# Patient Record
Sex: FEMALE | Race: WHITE | Employment: PART TIME | URBAN - METROPOLITAN AREA
[De-identification: names, ages, dates, MRNs, and addresses within clinical notes are randomized per-mention and may not be internally consistent; named-entity substitution may affect disease eponyms.]

---

## 2020-12-14 ENCOUNTER — HOSPITAL ENCOUNTER (INPATIENT)
Age: 25
LOS: 1 days | Discharge: HOME | DRG: 310 | End: 2020-12-15
Attending: EMERGENCY MEDICINE | Admitting: INTERNAL MEDICINE
Payer: COMMERCIAL

## 2020-12-14 ENCOUNTER — APPOINTMENT (OUTPATIENT)
Dept: GENERAL RADIOLOGY | Age: 25
DRG: 310 | End: 2020-12-14
Payer: COMMERCIAL

## 2020-12-14 PROBLEM — I48.91 ATRIAL FIBRILLATION WITH RAPID VENTRICULAR RESPONSE (HCC): Status: ACTIVE | Noted: 2020-12-14

## 2020-12-14 LAB
ABSOLUTE EOS #: 0.16 K/UL (ref 0–0.44)
ABSOLUTE IMMATURE GRANULOCYTE: 0.07 K/UL (ref 0–0.3)
ABSOLUTE LYMPH #: 2.32 K/UL (ref 1.1–3.7)
ABSOLUTE MONO #: 0.88 K/UL (ref 0.1–1.2)
AMPHETAMINE SCREEN URINE: NEGATIVE
ANION GAP SERPL CALCULATED.3IONS-SCNC: 13 MMOL/L (ref 9–17)
BARBITURATE SCREEN URINE: NEGATIVE
BASOPHILS # BLD: 0 % (ref 0–2)
BASOPHILS ABSOLUTE: 0.04 K/UL (ref 0–0.2)
BENZODIAZEPINE SCREEN, URINE: NEGATIVE
BUN BLDV-MCNC: 15 MG/DL (ref 6–20)
BUN/CREAT BLD: 18 (ref 9–20)
BUPRENORPHINE URINE: NORMAL
CALCIUM SERPL-MCNC: 10.2 MG/DL (ref 8.6–10.4)
CANNABINOID SCREEN URINE: NEGATIVE
CHLORIDE BLD-SCNC: 101 MMOL/L (ref 98–107)
CO2: 24 MMOL/L (ref 20–31)
COCAINE METABOLITE, URINE: NEGATIVE
CREAT SERPL-MCNC: 0.85 MG/DL (ref 0.5–0.9)
D-DIMER QUANTITATIVE: 1.05 MG/L FEU (ref 0–0.59)
DIFFERENTIAL TYPE: ABNORMAL
EOSINOPHILS RELATIVE PERCENT: 1 % (ref 1–4)
GFR AFRICAN AMERICAN: >60 ML/MIN
GFR NON-AFRICAN AMERICAN: >60 ML/MIN
GFR SERPL CREATININE-BSD FRML MDRD: ABNORMAL ML/MIN/{1.73_M2}
GFR SERPL CREATININE-BSD FRML MDRD: ABNORMAL ML/MIN/{1.73_M2}
GLUCOSE BLD-MCNC: 121 MG/DL (ref 70–99)
HCT VFR BLD CALC: 41.3 % (ref 36.3–47.1)
HEMOGLOBIN: 13.8 G/DL (ref 11.9–15.1)
IMMATURE GRANULOCYTES: 1 %
INR BLD: 1
LYMPHOCYTES # BLD: 21 % (ref 24–43)
MAGNESIUM: 2 MG/DL (ref 1.6–2.6)
MCH RBC QN AUTO: 29.4 PG (ref 25.2–33.5)
MCHC RBC AUTO-ENTMCNC: 33.4 G/DL (ref 28.4–34.8)
MCV RBC AUTO: 87.9 FL (ref 82.6–102.9)
MDMA URINE: NORMAL
METHADONE SCREEN, URINE: NEGATIVE
METHAMPHETAMINE, URINE: NORMAL
MONOCYTES # BLD: 8 % (ref 3–12)
MYOGLOBIN: <21 NG/ML (ref 25–58)
MYOGLOBIN: <21 NG/ML (ref 25–58)
NRBC AUTOMATED: 0 PER 100 WBC
OPIATES, URINE: NEGATIVE
OXYCODONE SCREEN URINE: NEGATIVE
PARTIAL THROMBOPLASTIN TIME: 27.1 SEC (ref 23.9–33.8)
PDW BLD-RTO: 12.5 % (ref 11.8–14.4)
PHENCYCLIDINE, URINE: NEGATIVE
PLATELET # BLD: 358 K/UL (ref 138–453)
PLATELET ESTIMATE: ABNORMAL
PMV BLD AUTO: 9.9 FL (ref 8.1–13.5)
POTASSIUM SERPL-SCNC: 3.7 MMOL/L (ref 3.7–5.3)
PROPOXYPHENE, URINE: NORMAL
PROTHROMBIN TIME: 12.9 SEC (ref 11.5–14.2)
RBC # BLD: 4.7 M/UL (ref 3.95–5.11)
RBC # BLD: ABNORMAL 10*6/UL
SEG NEUTROPHILS: 69 % (ref 36–65)
SEGMENTED NEUTROPHILS ABSOLUTE COUNT: 7.69 K/UL (ref 1.5–8.1)
SODIUM BLD-SCNC: 138 MMOL/L (ref 135–144)
TEST INFORMATION: NORMAL
TRICYCLIC ANTIDEPRESSANTS, UR: NORMAL
TROPONIN INTERP: ABNORMAL
TROPONIN INTERP: ABNORMAL
TROPONIN T: ABNORMAL NG/ML
TROPONIN T: ABNORMAL NG/ML
TROPONIN, HIGH SENSITIVITY: <6 NG/L (ref 0–14)
TROPONIN, HIGH SENSITIVITY: <6 NG/L (ref 0–14)
WBC # BLD: 11.2 K/UL (ref 3.5–11.3)
WBC # BLD: ABNORMAL 10*3/UL

## 2020-12-14 PROCEDURE — 83735 ASSAY OF MAGNESIUM: CPT

## 2020-12-14 PROCEDURE — 80048 BASIC METABOLIC PNL TOTAL CA: CPT

## 2020-12-14 PROCEDURE — 2060000000 HC ICU INTERMEDIATE R&B

## 2020-12-14 PROCEDURE — 71045 X-RAY EXAM CHEST 1 VIEW: CPT

## 2020-12-14 PROCEDURE — 6370000000 HC RX 637 (ALT 250 FOR IP): Performed by: NURSE PRACTITIONER

## 2020-12-14 PROCEDURE — 85025 COMPLETE CBC W/AUTO DIFF WBC: CPT

## 2020-12-14 PROCEDURE — 85610 PROTHROMBIN TIME: CPT

## 2020-12-14 PROCEDURE — 93005 ELECTROCARDIOGRAM TRACING: CPT | Performed by: EMERGENCY MEDICINE

## 2020-12-14 PROCEDURE — 85730 THROMBOPLASTIN TIME PARTIAL: CPT

## 2020-12-14 PROCEDURE — 84484 ASSAY OF TROPONIN QUANT: CPT

## 2020-12-14 PROCEDURE — 99283 EMERGENCY DEPT VISIT LOW MDM: CPT

## 2020-12-14 PROCEDURE — 85379 FIBRIN DEGRADATION QUANT: CPT

## 2020-12-14 PROCEDURE — 2580000003 HC RX 258: Performed by: NURSE PRACTITIONER

## 2020-12-14 PROCEDURE — 83874 ASSAY OF MYOGLOBIN: CPT

## 2020-12-14 PROCEDURE — 99222 1ST HOSP IP/OBS MODERATE 55: CPT | Performed by: NURSE PRACTITIONER

## 2020-12-14 PROCEDURE — 80307 DRUG TEST PRSMV CHEM ANLYZR: CPT

## 2020-12-14 PROCEDURE — 96374 THER/PROPH/DIAG INJ IV PUSH: CPT

## 2020-12-14 PROCEDURE — 93970 EXTREMITY STUDY: CPT

## 2020-12-14 PROCEDURE — 2580000003 HC RX 258: Performed by: EMERGENCY MEDICINE

## 2020-12-14 PROCEDURE — 2500000003 HC RX 250 WO HCPCS: Performed by: EMERGENCY MEDICINE

## 2020-12-14 RX ORDER — POLYETHYLENE GLYCOL 3350 17 G/17G
17 POWDER, FOR SOLUTION ORAL DAILY PRN
Status: DISCONTINUED | OUTPATIENT
Start: 2020-12-14 | End: 2020-12-15 | Stop reason: HOSPADM

## 2020-12-14 RX ORDER — FAMOTIDINE 20 MG/1
20 TABLET, FILM COATED ORAL DAILY
COMMUNITY

## 2020-12-14 RX ORDER — POTASSIUM CHLORIDE 20 MEQ/1
10 TABLET, EXTENDED RELEASE ORAL ONCE
Status: COMPLETED | OUTPATIENT
Start: 2020-12-14 | End: 2020-12-14

## 2020-12-14 RX ORDER — ONDANSETRON 2 MG/ML
4 INJECTION INTRAMUSCULAR; INTRAVENOUS EVERY 6 HOURS PRN
Status: DISCONTINUED | OUTPATIENT
Start: 2020-12-14 | End: 2020-12-15 | Stop reason: HOSPADM

## 2020-12-14 RX ORDER — ACETAMINOPHEN 325 MG/1
650 TABLET ORAL EVERY 6 HOURS PRN
Status: DISCONTINUED | OUTPATIENT
Start: 2020-12-14 | End: 2020-12-15 | Stop reason: HOSPADM

## 2020-12-14 RX ORDER — ACETAMINOPHEN 650 MG/1
650 SUPPOSITORY RECTAL EVERY 6 HOURS PRN
Status: DISCONTINUED | OUTPATIENT
Start: 2020-12-14 | End: 2020-12-15 | Stop reason: HOSPADM

## 2020-12-14 RX ORDER — DILTIAZEM HYDROCHLORIDE 5 MG/ML
20 INJECTION INTRAVENOUS ONCE
Status: COMPLETED | OUTPATIENT
Start: 2020-12-14 | End: 2020-12-14

## 2020-12-14 RX ORDER — FAMOTIDINE 20 MG/1
20 TABLET, FILM COATED ORAL DAILY
Status: DISCONTINUED | OUTPATIENT
Start: 2020-12-14 | End: 2020-12-15 | Stop reason: HOSPADM

## 2020-12-14 RX ORDER — METOPROLOL SUCCINATE 25 MG/1
25 TABLET, EXTENDED RELEASE ORAL DAILY
Status: DISCONTINUED | OUTPATIENT
Start: 2020-12-14 | End: 2020-12-15 | Stop reason: HOSPADM

## 2020-12-14 RX ORDER — SODIUM CHLORIDE 0.9 % (FLUSH) 0.9 %
10 SYRINGE (ML) INJECTION EVERY 12 HOURS SCHEDULED
Status: DISCONTINUED | OUTPATIENT
Start: 2020-12-14 | End: 2020-12-15 | Stop reason: HOSPADM

## 2020-12-14 RX ORDER — NICOTINE 21 MG/24HR
1 PATCH, TRANSDERMAL 24 HOURS TRANSDERMAL DAILY PRN
Status: DISCONTINUED | OUTPATIENT
Start: 2020-12-14 | End: 2020-12-15 | Stop reason: HOSPADM

## 2020-12-14 RX ORDER — PROMETHAZINE HYDROCHLORIDE 12.5 MG/1
12.5 TABLET ORAL EVERY 6 HOURS PRN
Status: DISCONTINUED | OUTPATIENT
Start: 2020-12-14 | End: 2020-12-15 | Stop reason: HOSPADM

## 2020-12-14 RX ORDER — SODIUM CHLORIDE 0.9 % (FLUSH) 0.9 %
10 SYRINGE (ML) INJECTION PRN
Status: DISCONTINUED | OUTPATIENT
Start: 2020-12-14 | End: 2020-12-15 | Stop reason: HOSPADM

## 2020-12-14 RX ADMIN — APIXABAN 5 MG: 5 TABLET, FILM COATED ORAL at 21:23

## 2020-12-14 RX ADMIN — DILTIAZEM HYDROCHLORIDE 10 MG/HR: 5 INJECTION INTRAVENOUS at 13:22

## 2020-12-14 RX ADMIN — METOPROLOL SUCCINATE 25 MG: 25 TABLET, EXTENDED RELEASE ORAL at 17:11

## 2020-12-14 RX ADMIN — Medication 10 ML: at 21:23

## 2020-12-14 RX ADMIN — ACETAMINOPHEN 650 MG: 325 TABLET ORAL at 17:16

## 2020-12-14 RX ADMIN — DILTIAZEM HYDROCHLORIDE 20 MG: 5 INJECTION INTRAVENOUS at 13:03

## 2020-12-14 RX ADMIN — FAMOTIDINE 20 MG: 20 TABLET, FILM COATED ORAL at 17:11

## 2020-12-14 RX ADMIN — POTASSIUM CHLORIDE 10 MEQ: 20 TABLET, EXTENDED RELEASE ORAL at 17:11

## 2020-12-14 SDOH — HEALTH STABILITY: MENTAL HEALTH: HOW OFTEN DO YOU HAVE A DRINK CONTAINING ALCOHOL?: NEVER

## 2020-12-14 SDOH — HEALTH STABILITY: MENTAL HEALTH: HOW MANY STANDARD DRINKS CONTAINING ALCOHOL DO YOU HAVE ON A TYPICAL DAY?: NOT ASKED

## 2020-12-14 ASSESSMENT — PAIN SCALES - GENERAL
PAINLEVEL_OUTOF10: 5
PAINLEVEL_OUTOF10: 6
PAINLEVEL_OUTOF10: 3

## 2020-12-14 ASSESSMENT — ENCOUNTER SYMPTOMS
VOMITING: 0
SHORTNESS OF BREATH: 1
FACIAL SWELLING: 0
DIARRHEA: 0
ABDOMINAL PAIN: 0
EYE DISCHARGE: 0
EYE REDNESS: 0
COUGH: 0
CONSTIPATION: 0
COLOR CHANGE: 0

## 2020-12-14 NOTE — CONSULTS
Reason for Consult:  Atrial fibrillation    HPI: Ms. Nemesio Crawford is a 27 yo patient who presented to the ED with palpitations and was found to be in atrial fibrillation with RVR. She was given IV Cardizem and started on a Cardizem drip. She states that she has had palpitations for the past few months associated with dyspnea and chest pain. She denies orthopnea, PND, or leg edema. She has had dizziness when she stands up quickly. Cardiology is asked to evaluate.      PMH:   Past Medical History:   Diagnosis Date    GERD without esophagitis     IBS (irritable bowel syndrome)        PSH:   Past Surgical History:   Procedure Laterality Date    BLADDER SURGERY      bladder reconstruction at age 4       Family HX:   Family History   Problem Relation Age of Onset    Hypertension Mother     Diabetes type 2  Mother     High Cholesterol Mother     Stroke Father     High Cholesterol Father         Social HX:   Social History     Socioeconomic History    Marital status: Single     Spouse name: Not on file    Number of children: Not on file    Years of education: Not on file    Highest education level: Not on file   Occupational History    Not on file   Social Needs    Financial resource strain: Not on file    Food insecurity     Worry: Not on file     Inability: Not on file    Transportation needs     Medical: Not on file     Non-medical: Not on file   Tobacco Use    Smoking status: Never Smoker    Smokeless tobacco: Never Used   Substance and Sexual Activity    Alcohol use: Never     Frequency: Never     Binge frequency: Never    Drug use: Never    Sexual activity: Not on file   Lifestyle    Physical activity     Days per week: Not on file     Minutes per session: Not on file    Stress: Not on file   Relationships    Social connections     Talks on phone: Not on file     Gets together: Not on file     Attends Buddhist service: Not on file     Active member of club or organization: Not on file     Attends meetings of clubs or organizations: Not on file     Relationship status: Not on file    Intimate partner violence     Fear of current or ex partner: Not on file     Emotionally abused: Not on file     Physically abused: Not on file     Forced sexual activity: Not on file   Other Topics Concern    Not on file   Social History Narrative    Not on file        Allergies: Allergies   Allergen Reactions    Codeine Rash    Demerol         Meds:  Prior to Admission medications    Medication Sig Start Date End Date Taking? Authorizing Provider   famotidine (PEPCID) 20 MG tablet Take 20 mg by mouth daily   Yes Historical Provider, MD        ROS: As per HPI    Exam: VS: /60   Pulse 102   Temp 98.2 °F (36.8 °C) (Oral)   Resp 24   Ht 5' 4\" (1.626 m)   Wt 165 lb (74.8 kg)   LMP  (LMP Unknown)   SpO2 98%   BMI 28.32 kg/m²      Wt. Weight change:     I/O: No intake/output data recorded.      Monitor: Atrial fibrillation with Vrate 90's up to 150's    General Appearance: AOX3, NAD  Skin:pink, warm, dry  Pulmonary/Chest: CTA  Cardiovascular: S1S2, irregular, negative murmur, negative gallop, negative JVD, negative carotid bruit  Abdomen: BS present, soft, nontender  Extremities: No peripheral edema    Labs:   CBC with Differential:    Lab Results   Component Value Date    WBC 11.2 12/14/2020    RBC 4.70 12/14/2020    HGB 13.8 12/14/2020    HCT 41.3 12/14/2020     12/14/2020    MCV 87.9 12/14/2020    MCH 29.4 12/14/2020    MCHC 33.4 12/14/2020    RDW 12.5 12/14/2020    LYMPHOPCT 21 12/14/2020    MONOPCT 8 12/14/2020    BASOPCT 0 12/14/2020    MONOSABS 0.88 12/14/2020    LYMPHSABS 2.32 12/14/2020    EOSABS 0.16 12/14/2020    BASOSABS 0.04 12/14/2020    DIFFTYPE NOT REPORTED 12/14/2020       BMP:    Lab Results   Component Value Date     12/14/2020    K 3.7 12/14/2020     12/14/2020    CO2 24 12/14/2020    BUN 15 12/14/2020    CREATININE 0.85 12/14/2020    CALCIUM 10.2 12/14/2020    GFRAA >60

## 2020-12-14 NOTE — PLAN OF CARE
Problem: Falls - Risk of:  Goal: Will remain free from falls  Description: Will remain free from falls  Outcome: Ongoing  Patient is fall risk per fall scale. Falling star on door. Fall sticker on armband. Hourly rounding performed. Personal belongings and call light within reach. Bed in low position. Problem: Cardiac:  Goal: Ability to maintain an adequate cardiac output will improve  Description: Ability to maintain an adequate cardiac output will improve  Outcome: Ongoing  No edema present. Mucous membranes moist. Tolerates fluids. Iv fluids per orders.

## 2020-12-14 NOTE — ED NOTES
Pt to ED c/o of irregular heartbeat. Pt states she her heart beating in her chest. Pt is AOx4.        Joan Davies RN  12/14/20 0246

## 2020-12-14 NOTE — H&P
[facility]  Office: [address], DO, Junaid Villafuerte, DO, Sunny Nance, DO, Mike Acosta, DO, Krunal Urena MD, Checo Gillette MD, Beto Ricci MD, Ijeoma Gonzalez MD, Matthew Arnold MD, Samson Sigala MD, Maddie Lima MD, Darrel Brand MD, Mbonu Fleet Cowden, MD, Devante Lizarraga DO, Rhiannon Jeronimo MD, Paige Mulligan MD, Chani Ware DO, Armand Pagan MD,  Juanpablo Wolff DO, Marlyn Cano MD, Nicolas Bansal MD, Delmi Palencia [facility], [address], CNP, Naomi Snow, CNP, Poly Valenzuela, [facility], Sushant Cisneros, CNP, Alicia Vazquez, CNP, Jeff Ogden, CNP, Jackson Forte, CNP, Wallace Nuñez, CNP, Lily Paula PA-C, Kristin Doll, ROBLES, Nishi Roa, CNP, Nguyễn Kruger, CNP, Aramis Pickett, CNP, Odalis Bueno, CNP, Paulino Benavides, [facility]    HISTORY AND PHYSICAL EXAMINATION            Date:   12/14/2020  Patient name:  Bandar Peñaloza  Date of admission:  12/14/2020 12:41 PM  MRN:   [id number]  Account:  [de-identified]  YOB: 1995  PCP:    No primary care provider on file. Room:   [address] [place] [address]  Code Status:    Full Code    Chief Complaint:     Chief Complaint   Patient presents with    Dizziness    Chest Pain       History Obtained From:     patient    History of Present Illness:     Bandar Peñaloza is a 26 y.o. Non-/non  female who presents with Dizziness and Chest Pain  and is admitted to the hospital for the management of Atrial fibrillation with rapid ventricular response ([facility] [address] Patient reports she has been having intermittent episodes of palpitations, chest pain and dizziness over the last few months typically lasting all day. She states today she became short of breath and this is the worst she has felt during these episodes. She was found to be in A. fib with RVR while in ED and given Cardizem bolus and started on Cardizem drip.   She denies any previous cardiac issues or significant past medical history. She denies any drug or alcohol use. She is currently not having any chest pain, shortness of breath or dizziness. He does report some left intermittent calf pain over the last few weeks, denies swelling, numbness or tingling  Past Medical History:     Past Medical History:   Diagnosis Date    GERD without esophagitis     IBS (irritable bowel syndrome)         Past Surgical History:     Past Surgical History:   Procedure Laterality Date    BLADDER SURGERY      bladder reconstruction at age 4        Medications Prior to Admission:     Prior to Admission medications    Medication Sig Start Date End Date Taking? Authorizing Provider   famotidine (PEPCID) 20 MG tablet Take 20 mg by mouth daily   Yes Historical Provider, MD        Allergies:     Codeine and Demerol    Social History:     Tobacco:    reports that she has never smoked. She has never used smokeless tobacco.  Alcohol:      reports no history of alcohol use. Drug Use:  reports no history of drug use. Family History:     Family History   Problem Relation Age of Onset    Hypertension Mother     Diabetes type 2  Mother     High Cholesterol Mother     Stroke Father     High Cholesterol Father        Review of Systems:     Positive and Negative as described in HPI.     CONSTITUTIONAL:  negative for fevers, chills, sweats, fatigue, weight loss  HEENT:  negative for vision, hearing changes, runny nose, throat pain  RESPIRATORY: Positive for shortness of breath, negative for cough, congestion, wheezing  CARDIOVASCULAR: Positive for chest pain, palpitations  GASTROINTESTINAL:  negative for nausea, vomiting, diarrhea, constipation, change in bowel habits, abdominal pain   GENITOURINARY:  negative for difficulty of urination, burning with urination, frequency   INTEGUMENT:  negative for rash, skin lesions, easy bruising   HEMATOLOGIC/LYMPHATIC:  negative for swelling/edema   ALLERGIC/IMMUNOLOGIC:  negative 12:51 PM   Result Value Ref Range    Ventricular Rate 152 BPM    Atrial Rate 159 BPM    QRS Duration 92 ms    Q-T Interval 284 ms    QTc Calculation (Bazett) 451 ms    R Axis 58 degrees    T Axis 22 degrees   Basic Metabolic Panel    Collection Time: 12/14/20  1:03 PM   Result Value Ref Range    Glucose 121 (H) 70 - 99 mg/dL    BUN 15 6 - 20 mg/dL    CREATININE 0.85 0.50 - 0.90 mg/dL    Bun/Cre Ratio 18 9 - 20    Calcium 10.2 8.6 - 10.4 mg/dL    Sodium 138 135 - 144 mmol/L    Potassium 3.7 3.7 - 5.3 mmol/L    Chloride 101 98 - 107 mmol/L    CO2 24 20 - 31 mmol/L    Anion Gap 13 9 - 17 mmol/L    GFR Non-African American >60 >60 mL/min    GFR African American >60 >60 mL/min    GFR Comment          GFR Staging NOT REPORTED    CBC Auto Differential    Collection Time: 12/14/20  1:03 PM   Result Value Ref Range    WBC 11.2 3.5 - 11.3 k/uL    RBC 4.70 3.95 - 5.11 m/uL    Hemoglobin 13.8 11.9 - 15.1 g/dL    Hematocrit 41.3 36.3 - 47.1 %    MCV 87.9 82.6 - 102.9 fL    MCH 29.4 25.2 - 33.5 pg    MCHC 33.4 28.4 - 34.8 g/dL    RDW 12.5 11.8 - 14.4 %    Platelets 160 741 - 993 k/uL    MPV 9.9 8.1 - 13.5 fL    NRBC Automated 0.0 0.0 per 100 WBC    Differential Type NOT REPORTED     Seg Neutrophils 69 (H) 36 - 65 %    Lymphocytes 21 (L) 24 - 43 %    Monocytes 8 3 - 12 %    Eosinophils % 1 1 - 4 %    Basophils 0 0 - 2 %    Immature Granulocytes 1 (H) 0 %    Segs Absolute 7.69 1.50 - 8.10 k/uL    Absolute Lymph # 2.32 1.10 - 3.70 k/uL    Absolute Mono # 0.88 0.10 - 1.20 k/uL    Absolute Eos # 0.16 0.00 - 0.44 k/uL    Basophils Absolute 0.04 0.00 - 0.20 k/uL    Absolute Immature Granulocyte 0.07 0.00 - 0.30 k/uL    WBC Morphology NOT REPORTED     RBC Morphology NOT REPORTED     Platelet Estimate NOT REPORTED    TROP/MYOGLOBIN    Collection Time: 12/14/20  1:03 PM   Result Value Ref Range    Troponin, High Sensitivity <6 0 - 14 ng/L    Troponin T NOT REPORTED <0.03 ng/mL    Troponin Interp NOT REPORTED     Myoglobin <21 (L) 25 - 58 ng/mL   APTT    Collection Time: 12/14/20  1:03 PM   Result Value Ref Range    PTT 27.1 23.9 - 33.8 sec   Protime-INR    Collection Time: 12/14/20  1:03 PM   Result Value Ref Range    Protime 12.9 11.5 - 14.2 sec    INR 1.0    Urine Drug Screen    Collection Time: 12/14/20  2:49 PM   Result Value Ref Range    Amphetamine Screen, Ur NEGATIVE NEGATIVE    Barbiturate Screen, Ur NEGATIVE NEGATIVE    Benzodiazepine Screen, Urine NEGATIVE NEGATIVE    Cocaine Metabolite, Urine NEGATIVE NEGATIVE    Methadone Screen, Urine NEGATIVE NEGATIVE    Opiates, Urine NEGATIVE NEGATIVE    Phencyclidine, Urine NEGATIVE NEGATIVE    Propoxyphene, Urine NOT REPORTED NEGATIVE    Cannabinoid Scrn, Ur NEGATIVE NEGATIVE    Oxycodone Screen, Ur NEGATIVE NEGATIVE    Methamphetamine, Urine NOT REPORTED NEGATIVE    Tricyclic Antidepressants, Urine NOT REPORTED NEGATIVE    MDMA, Urine NOT REPORTED NEGATIVE    Buprenorphine Urine NOT REPORTED NEGATIVE    Test Information       Assay provides medical screening only. The absence of expected drug(s) and/or metabolite(s) may indicate diluted or adulterated urine, limitations of testing or timing of collection. TROP/MYOGLOBIN    Collection Time: 12/14/20  3:09 PM   Result Value Ref Range    Troponin, High Sensitivity <6 0 - 14 ng/L    Troponin T NOT REPORTED <0.03 ng/mL    Troponin Interp NOT REPORTED     Myoglobin <21 (L) 25 - 58 ng/mL       Imaging/Diagnostics:  Xr Chest Portable    Result Date: 12/14/2020  No acute cardiopulmonary process       Assessment :      Hospital Problems           Last Modified POA    * (Principal) Atrial fibrillation with rapid ventricular response ([address] 12/14/2020 Yes    GERD without esophagitis 12/14/2020 Yes    IBS (irritable bowel syndrome) 12/14/2020 Yes          Plan:     Patient status inpatient in the Progressive Unit/Step down    1. Resume home meds  2. IV cardizem gtt for rate control   3. Monitor labs, replace electrolytes as needed  4.  Therapeutic Lovenox twice daily  5. Telemetry monitoring  6. Cardiac echo  7. Cardiology consult  8. Check TSH  9. Check D-dimer  10. Venous Dopplers  11. Trend cardiac enzymes, negative x 2  12. Drug screen negative   13. GI prophylaxis  14. Plan discussed with patient and staff       Consultations:   IP CONSULT TO HOSPITALIST  IP CONSULT TO CARDIOLOGY     Patient is admitted as inpatient status because of co-morbidities listed above, severity of signs and symptoms as outlined, requirement for current medical therapies and most importantly because of direct risk to patient if care not provided in a hospital setting. Expected length of stay > 48 hours. JONATAN WALLS NP  12/14/2020  4:17 PM    Copy sent to Dr. Nettles primary care provider on file.

## 2020-12-14 NOTE — ED PROVIDER NOTES
[facility] ED  EMERGENCY DEPARTMENT ENCOUNTER      Pt Name: Tristan Luis  MRN: [id number]  [place] 1995  Date of evaluation: 12/14/2020  Provider: Jhon Nguyễn MD    [address] [place] [address]       Chief Complaint   Patient presents with    Dizziness    Chest Pain         HISTORY OF PRESENT ILLNESS  (Location/Symptom, Timing/Onset, Context/Setting, Quality, Duration, Modifying Factors, Severity.)   Tristan Luis is a 26 y.o. female who presents to the emergency department for feeling dizzy and a funny feeling in her chest.  She has been having palpitations. It seems to been going on and off and somebody checked her today and told her to come to the hospital.  She rated the pain as a 6. No fever or productive cough. No syncope. She has no personal history of atrial fibrillation. Symptoms have been intermittent apparently for months but continuous today. Nursing Notes were reviewed. ALLERGIES     Demerol    CURRENT MEDICATIONS       Previous Medications    No medications on file       PAST MEDICAL HISTORY   No past medical history on file. SURGICAL HISTORY     No past surgical history on file. FAMILY HISTORY     No family history on file. No family status information on file. SOCIAL HISTORY          REVIEW OF SYSTEMS    (2-9 systems for level 4, 10 or more for level 5)     Review of Systems   Constitutional: Negative for chills, fatigue and fever. HENT: Negative for congestion, ear discharge and facial swelling. Eyes: Negative for discharge and redness. Respiratory: Positive for shortness of breath. Negative for cough. Cardiovascular: Positive for chest pain and palpitations. Gastrointestinal: Negative for abdominal pain, constipation, diarrhea and vomiting. Genitourinary: Negative for dysuria and hematuria. Musculoskeletal: Negative for arthralgias. Skin: Negative for color change and rash.    Neurological: Negative for syncope, numbness and headaches. Hematological: Negative for adenopathy. Psychiatric/Behavioral: Negative for confusion. The patient is not nervous/anxious. Except as noted above the remainder of the review of systems was reviewed and negative. PHYSICAL EXAM    (up to 7 for level 4, 8 or more for level 5)     Vitals:    12/14/20 1240   BP: (!) 157/109   Pulse: 155   Resp: 16   Temp: 98.5 °F (36.9 °C)   TempSrc: Oral   SpO2: 94%   Weight: 165 lb (74.8 kg)   Height: 5' 4\" (1.626 m)       Physical Exam  Vitals signs reviewed. Constitutional:       General: She is not in acute distress. Appearance: She is well-developed. She is not diaphoretic. HENT:      Head: Normocephalic and atraumatic. Eyes:      General: No scleral icterus. Right eye: No discharge. Left eye: No discharge. Neck:      Musculoskeletal: Neck supple. Cardiovascular:      Rate and Rhythm: Tachycardia present. Rhythm irregular. Pulmonary:      Effort: Pulmonary effort is normal. No respiratory distress. Breath sounds: Normal breath sounds. No stridor. No wheezing or rales. Abdominal:      General: There is no distension. Palpations: Abdomen is soft. Tenderness: There is no abdominal tenderness. Musculoskeletal: Normal range of motion. Lymphadenopathy:      Cervical: No cervical adenopathy. Skin:     General: Skin is warm and dry. Findings: No erythema or rash. Neurological:      Mental Status: She is alert and oriented to person, place, and time.    Psychiatric:         Behavior: Behavior normal.             DIAGNOSTIC RESULTS     EKG: All EKG's are interpreted by the Emergency Department Physician who either signs or Co-signs this chart in the absence of a cardiologist.    Initial EKG on my interpretation shows atrial fibrillation with RVR    RADIOLOGY:   Non-plain film images such as CT, Ultrasound and MRI are read by the radiologist. Plain radiographic images are visualized and preliminarily interpreted by the emergency physician with the below findings:    Interpretation per the Radiologist below, if available at the time of this note:    Xr Chest Portable    Result Date: 12/14/2020  EXAMINATION: ONE XRAY VIEW OF THE CHEST 12/14/2020 1:08 pm COMPARISON: None. HISTORY: ORDERING SYSTEM PROVIDED HISTORY: Chest Pain TECHNOLOGIST PROVIDED HISTORY: Chest Pain Acuity: Acute Type of Exam: Initial FINDINGS: Heart size within normal limits without evidence of vascular congestion. Lungs are clear. No focal infiltrates are seen. No significant pleural effusions. Monitor leads overlie the chest.     No acute cardiopulmonary process     LABS:  Labs Reviewed   BASIC METABOLIC PANEL - Abnormal; Notable for the following components:       Result Value    Glucose 121 (*)     All other components within normal limits   CBC WITH AUTO DIFFERENTIAL - Abnormal; Notable for the following components:    Seg Neutrophils 69 (*)     Lymphocytes 21 (*)     Immature Granulocytes 1 (*)     All other components within normal limits   TROP/MYOGLOBIN - Abnormal; Notable for the following components:    Myoglobin <21 (*)     All other components within normal limits   APTT   PROTIME-INR   TROP/MYOGLOBIN       All other labs were within normal range or not returned as of this dictation. EMERGENCY DEPARTMENT COURSE and DIFFERENTIAL DIAGNOSIS/MDM:   Vitals:    Vitals:    12/14/20 1240   BP: (!) 157/109   Pulse: 155   Resp: 16   Temp: 98.5 °F (36.9 °C)   TempSrc: Oral   SpO2: 94%   Weight: 165 lb (74.8 kg)   Height: 5' 4\" (1.626 m)       Orders Placed This Encounter   Medications    dilTIAZem injection 20 mg    dilTIAZem 125 mg in dextrose 5 % 125 mL infusion       Medical Decision Making: The patient presents with atrial fibrillation and RVR. She was given IV Cardizem bolus and then placed on a drip and this brought her heart rate down appropriately. She is being admitted on a Cardizem drip.   Treatment diagnosis and disposition were discussed with the patient. CRITICAL CARE TIME     Due to the high probability of sudden and clinically significant deterioration in the patient's condition she required highest level of my preparedness to intervene urgently. I provided critical care time including documentation time, medication orders and management, reevaluation, vital sign assessment, ordering and reviewing of of lab tests ordering and reviewing of x-ray studies, and admission orders. Aggregate critical care time is 35 minutes including only time during which I was engaged in work directly related to her care and did not include time spent treating other patients simultaneously. CONSULTS:  IP CONSULT TO HOSPITALIST    PROCEDURES:  None    FINAL IMPRESSION      1. Atrial fibrillation with RVR ([facility] [address]          DISPOSITION/PLAN   DISPOSITION Decision To Admit 12/14/2020 01:38:34 PM      PATIENT REFERRED TO:   No follow-up provider specified.     DISCHARGE MEDICATIONS:     New Prescriptions    No medications on file         (Please note that portions of this note were completed with a voice recognition program.  Efforts were made to edit the dictations but occasionally words are mis-transcribed.)    Marcella Velazquez MD  Attending Emergency Physician           Marcella Velazquez MD  12/14/20 [id number]

## 2020-12-14 NOTE — CARE COORDINATION
Case Management Initial Discharge Plan  Richellema Eduardocy,         Readmission Risk              Risk of Unplanned Readmission:        6             Met with:patient to discuss discharge plans. Information verified: address, contacts, phone number, , insurance Yes  PCP: No primary care provider on file. Date of last visit: Does not have pcp but is agreeable to being set up with new pcp    Insurance Provider: [employer] [address]    Discharge Planning  Current Residence:   House  Living Arrangements:   mother   Home has 1 stories/1 stair to climb  Support Systems:   mother  Current Services PTA:   no Supplier: none  Patient able to perform ADL's:Independent  DME used to aid ambulation prior to admission: NA  during admission: NA    Potential Assistance Needed:   NA    Pharmacy: [facility] Medications:   no  Does patient want to participate in local refill/ meds to beds program?   no    Patient agreeable to home care: No  [place] of choice provided:  no      Type of Home Care Services:     Patient expects to be discharged to:   home    Prior SNF/Rehab Placement and Facility: no  Agreeable to SNF/Rehab: No  [place] of choice provided: yes   Evaluation: yes    Expected Discharge date: Follow Up Appointment: Best Day/ Time:      Transportation provider: family  Transportation arrangements needed for discharge: No    Discharge Plan:   Patient lives at home with mother in 1 story home with 1 step to enter. Patient was independent and working prior to admission. Patient denied any drug or alcohol use. SBIRT completed. Patient admitted for A-Fib RVR. Patient may need a cardiologist. Discharge needs tbd.         Electronically signed by DONNY Conn on 20 at 2:43 PM EST

## 2020-12-14 NOTE — LETTER
[facility]  [address] [facility] [address]  Phone: [phone]             December 15, 2020    Patient: Jack Cotto   YOB: 1995   Date of Visit: 12/14/2020       To Whom It May Concern:    Roderick Murillo was seen and treated in our facility  beginning 12/14/2020 until 12/15/2020. She may return to work on 12/21/2020.       Sincerely,       Arlyne Gosselin, APRN - NP         Signature:__________________________________

## 2020-12-15 VITALS
WEIGHT: 206.25 LBS | TEMPERATURE: 98 F | SYSTOLIC BLOOD PRESSURE: 113 MMHG | DIASTOLIC BLOOD PRESSURE: 53 MMHG | RESPIRATION RATE: 16 BRPM | OXYGEN SATURATION: 100 % | HEART RATE: 71 BPM | BODY MASS INDEX: 35.21 KG/M2 | HEIGHT: 64 IN

## 2020-12-15 PROBLEM — I48.91 ATRIAL FIBRILLATION WITH RAPID VENTRICULAR RESPONSE (HCC): Status: RESOLVED | Noted: 2020-12-14 | Resolved: 2020-12-15

## 2020-12-15 LAB
ALBUMIN SERPL-MCNC: 4.3 G/DL (ref 3.5–5.2)
ALBUMIN/GLOBULIN RATIO: ABNORMAL (ref 1–2.5)
ALP BLD-CCNC: 102 U/L (ref 35–104)
ALT SERPL-CCNC: 14 U/L (ref 5–33)
ANION GAP SERPL CALCULATED.3IONS-SCNC: 9 MMOL/L (ref 9–17)
AST SERPL-CCNC: 17 U/L
BILIRUB SERPL-MCNC: 0.21 MG/DL (ref 0.3–1.2)
BNP INTERPRETATION: NORMAL
BUN BLDV-MCNC: 17 MG/DL (ref 6–20)
BUN/CREAT BLD: 25 (ref 9–20)
CALCIUM SERPL-MCNC: 9.5 MG/DL (ref 8.6–10.4)
CHLORIDE BLD-SCNC: 104 MMOL/L (ref 98–107)
CO2: 25 MMOL/L (ref 20–31)
CREAT SERPL-MCNC: 0.67 MG/DL (ref 0.5–0.9)
GFR AFRICAN AMERICAN: >60 ML/MIN
GFR NON-AFRICAN AMERICAN: >60 ML/MIN
GFR SERPL CREATININE-BSD FRML MDRD: ABNORMAL ML/MIN/{1.73_M2}
GFR SERPL CREATININE-BSD FRML MDRD: ABNORMAL ML/MIN/{1.73_M2}
GLUCOSE BLD-MCNC: 92 MG/DL (ref 70–99)
INR BLD: 1
LV EF: 65 %
LVEF MODALITY: NORMAL
MAGNESIUM: 2.1 MG/DL (ref 1.6–2.6)
POTASSIUM SERPL-SCNC: 4.3 MMOL/L (ref 3.7–5.3)
PRO-BNP: 120 PG/ML
PROTHROMBIN TIME: 12.7 SEC (ref 11.5–14.2)
SODIUM BLD-SCNC: 138 MMOL/L (ref 135–144)
TOTAL PROTEIN: 8 G/DL (ref 6.4–8.3)
TSH SERPL DL<=0.05 MIU/L-ACNC: 4.49 MIU/L (ref 0.3–5)

## 2020-12-15 PROCEDURE — 84443 ASSAY THYROID STIM HORMONE: CPT

## 2020-12-15 PROCEDURE — 6370000000 HC RX 637 (ALT 250 FOR IP): Performed by: NURSE PRACTITIONER

## 2020-12-15 PROCEDURE — APPSS45 APP SPLIT SHARED TIME 31-45 MINUTES: Performed by: NURSE PRACTITIONER

## 2020-12-15 PROCEDURE — 83880 ASSAY OF NATRIURETIC PEPTIDE: CPT

## 2020-12-15 PROCEDURE — 85610 PROTHROMBIN TIME: CPT

## 2020-12-15 PROCEDURE — 80053 COMPREHEN METABOLIC PANEL: CPT

## 2020-12-15 PROCEDURE — 2580000003 HC RX 258: Performed by: NURSE PRACTITIONER

## 2020-12-15 PROCEDURE — 83735 ASSAY OF MAGNESIUM: CPT

## 2020-12-15 PROCEDURE — 99238 HOSP IP/OBS DSCHRG MGMT 30/<: CPT | Performed by: INTERNAL MEDICINE

## 2020-12-15 PROCEDURE — 93306 TTE W/DOPPLER COMPLETE: CPT

## 2020-12-15 PROCEDURE — 36415 COLL VENOUS BLD VENIPUNCTURE: CPT

## 2020-12-15 RX ORDER — METOPROLOL SUCCINATE 25 MG/1
25 TABLET, EXTENDED RELEASE ORAL DAILY
Qty: 30 TABLET | Refills: 3 | Status: SHIPPED | OUTPATIENT
Start: 2020-12-16

## 2020-12-15 RX ORDER — ASPIRIN 81 MG/1
81 TABLET ORAL DAILY
Qty: 30 TABLET | Refills: 3 | Status: SHIPPED | OUTPATIENT
Start: 2020-12-15

## 2020-12-15 RX ORDER — ASPIRIN 81 MG/1
81 TABLET ORAL DAILY
Status: DISCONTINUED | OUTPATIENT
Start: 2020-12-15 | End: 2020-12-15 | Stop reason: HOSPADM

## 2020-12-15 RX ADMIN — FAMOTIDINE 20 MG: 20 TABLET, FILM COATED ORAL at 10:09

## 2020-12-15 RX ADMIN — METOPROLOL SUCCINATE 25 MG: 25 TABLET, EXTENDED RELEASE ORAL at 10:09

## 2020-12-15 RX ADMIN — Medication 10 ML: at 10:10

## 2020-12-15 RX ADMIN — APIXABAN 5 MG: 5 TABLET, FILM COATED ORAL at 10:09

## 2020-12-15 NOTE — PLAN OF CARE
Problem: Falls - Risk of:  Goal: Will remain free from falls  Description: Will remain free from falls  12/15/2020 0141 by Rylan Presley RN  Outcome: Ongoing     Problem: Bleeding:  Goal: Will show no signs and symptoms of excessive bleeding  Description: Will show no signs and symptoms of excessive bleeding  12/15/2020 0141 by Rylan Presley RN  Outcome: Ongoing     Problem: Cardiac:  Goal: Ability to maintain an adequate cardiac output will improve  Description: Ability to maintain an adequate cardiac output will improve  12/15/2020 0141 by Rylan Presley RN  Outcome: Ongoing     Problem: Safety:  Goal: Free from accidental physical injury  Description: Free from accidental physical injury  Outcome: Ongoing     Problem: Pain:  Goal: Patient's pain/discomfort is manageable  Description: Patient's pain/discomfort is manageable  Outcome: Ongoing

## 2020-12-15 NOTE — PROGRESS NOTES
Discharge medications and instructions reviewed with the pt and she verbalized / signed understanding. Pt taken off tele and INT d/c'd with cath intact. Pt escorted off unit with all belonging and discharged home.  Pt to f/u with cardiology in one month and  RXs at [facility]

## 2020-12-15 NOTE — CARE COORDINATION
Discharge planning    Patient chart reviewed. Appreciate prior ED SS Initial assessment. Per SS patient lives with mother, independent, works and uses no DME>     Patient admitted with a fib RVR and cardiology is following. Eliquis has been started. Patient has BCBS and will be eligible for free month of eliquis along with co pay card. Will need to print and place in patient chart. At this time unable to print from our [facility] and website. . will need to follow up with manager. JORGITO Kohler did leave rx for eliquis in chart for 6 months. . printed up coupons ( able to access our old hub ) will run when pharmacy is open. Patient pharmacy is [facility] [phone]    Patient has no PCP and will need list and possible set up on discharge. Call to [facility] and cost per month will be     Notified per RN that she spoke with DR Xiomy Wynn and will send patient home with baby asa only , eliquis can be cancelled. Updated patient and call to cardiologist  office to make appt ,but they need her to call office due to all the information they require.      Call back, to [facility] and placed rx on profile

## 2020-12-15 NOTE — PROGRESS NOTES
[facility]  Office: [address], DO, Nabeel Roland, DO, Abby Lockbourne, DO, Nargis Torres Blood, DO, Adarsh Bains MD, Nery Segundo MD, Haydee Maldonado MD, Kp Brink MD, Francisco Toussaint MD, Coty Blanca MD, Ramu Prasad MD, Daniel Tobin MD, Jenni Duarte MD, Tegan Manzano DO, Roxanne Montes De Oca MD, Federico Blanc MD, Paulie Trevino DO, Zina Solano MD,  Dillan Saul DO, Kristen Noland MD, Que Robertson MD, David Faith, [facility], [facility] Debra, [facility], Steven Pepe, CNP, Mireille Quintanilla, CNS, Clarence Miller, CNP, Selina Fam, CNP, Timmy Hirsch, CNP, Taylor Hutson, CNP, Odilon Gomez, CNP, Elias Hyman PA-C, Jaimie Adrian, [facility], Jada Nuñez, CNP, Lieutenant Benavidez, CNP, Adele Zamudio, CNP, Hiro Stone, CNP, Luis Antonio Stephens, [facility]    Progress Note    12/15/2020    11:39 AM    Name:   Adis Salinas  MRN:     [id number]     Acct:      [de-identified]   Room:   1009/1009-02   Day:  1  Admit Date:  12/14/2020 12:41 PM    PCP:   Ana Birmingham  Code Status:  Full Code    Subjective:     C/C:   Chief Complaint   Patient presents with    Dizziness    Chest Pain     Interval History Status: improved. Patient converted to NSR yesterday around 6pm, no further episodes of afib. No new complaints. VSS,     Brief History:   Adis Salinas is a 26 y.o. Non-/non  female who presents with Dizziness and Chest Painand is admitted to the hospital for the management of Atrial fibrillation with rapid ventricular response ([facility] [address] Patient reports she has been having intermittent episodes of palpitations, chest pain and dizziness over the last few months typically lasting all day. She states today she became short of breath and this is the worst she has felt during these episodes. She was found to be in A. fib with RVR while in ED and given Cardizem bolus and started on Cardizem drip.   She denies 12/15/2020 1139  Last data filed at 12/15/2020 0600  Gross per 24 hour   Intake --   Output 900 ml   Net -900 ml       Labs:  Hematology:  Recent Labs     12/14/20  1303 12/15/20  0450   WBC 11.2  --    RBC 4.70  --    HGB 13.8  --    HCT 41.3  --    MCV 87.9  --    MCH 29.4  --    MCHC 33.4  --    RDW 12.5  --      --    MPV 9.9  --    INR 1.0 1.0   DDIMER 1.05*  --      Chemistry:  Recent Labs     12/14/20  1303 12/14/20  1509 12/15/20  0450     --  138   K 3.7  --  4.3     --  104   CO2 24  --  25   GLUCOSE 121*  --  92   BUN 15  --  17   CREATININE 0.85  --  0.67   MG 2.0  --  2.1   ANIONGAP 13  --  9   LABGLOM >60  --  >60   GFRAA >60  --  >60   CALCIUM 10.2  --  9.5   PROBNP  --   --  120   TROPHS <6 <6  --    MYOGLOBIN <21* <21*  --      Recent Labs     12/15/20  0450   PROT 8.0   LABALBU 4.3   TSH 4.49   AST 17   ALT 14   ALKPHOS 102   BILITOT 0.21*     ABG:No results found for: POCPH, PHART, PH, POCPCO2, PIM0RVP, PCO2, POCPO2, PO2ART, PO2, POCHCO3, MRD6ZQO, HCO3, NBEA, PBEA, BEART, BE, THGBART, THB, TVF5HLW, QGTE8WGH, R5RUYKGM, O2SAT, FIO2  Lab Results   Component Value Date/Time    SPECIAL NOT REPORTED 01/10/2012 06:46 PM     Lab Results   Component Value Date/Time    CULTURE NO SIGNIFICANT GROWTH 01/10/2012 06:46 PM    CULTURE  01/10/2012 06:46 PM     Performed at [address] [place] [address] 3 [phone]       Radiology:  Xr Chest Portable    Result Date: 12/14/2020  No acute cardiopulmonary process       Physical Examination:        General appearance:  alert, cooperative and no distress  Mental Status:  oriented to person, place and time and normal affect  Lungs:  clear to auscultation bilaterally, normal effort  Heart:  regular rate and rhythm, no murmur  Abdomen:  soft, nontender, nondistended, normal bowel sounds, no masses, hepatomegaly, splenomegaly  Extremities:  no edema, redness, tenderness in the calves  Skin:  no gross lesions, rashes, induration    Assessment:        Hospital Problems           Last Modified POA    * (Principal) Atrial fibrillation with rapid ventricular response ([facility] [address] 12/14/2020 Yes    GERD without esophagitis 12/14/2020 Yes    IBS (irritable bowel syndrome) 12/14/2020 Yes          Plan:        1. Cardiac recommendations appreciated, no need for eliquis at this time, CHADS score =1. Start baby asa, continue toprol XL   2. Monitor labs, replace electrolytes as needed   3. Discharge home today when arrangements made, patient given PCP list and will need to follow up with cardiology as directed   4. TSH normal  5. Venous Dopplers negative  6. Cardiac echo unremarkable, EF 65%  7. Return to work slip provided   8.  Plan discussed with patient and staff     JONATAN WALLS NP  12/15/2020  11:39 AM

## 2020-12-15 NOTE — DISCHARGE SUMMARY
[facility]  Office: [address], DO, Nabeel Roland, DO, Timmyabel Echols, DO, Nargis Torres Blood, DO, Adarsh Bains MD, Nery Segundo MD, Haydee Maldonado MD, Kp Brink MD, Francisco Toussaint MD, Coty Blanca MD, Ramu Prasad MD, Daniel Tobin MD, Jenni Duarte MD, Tegan Manzano DO, Roxanne Montes De Oca MD, Federico Blanc MD, Paulie Trevino DO, Zina Solano MD,  Dillan Saul DO, Kristen Noland MD, Que Robertson MD, David Faith, [facility], [facility], [facility], Steven [facility], CNP, Mireille Quintanilla, CNS, Clarence Miller, CNP, Selina Fam, CNP, Timmy Hirsch, CNP, Taylor Hutson, CNP, Odilon Gomez, CNP, Elias Hyman PA-C, Jaimie Adrian, [facility], Jada Nuñez, CNP, Lieutenant Benavidez, CNP, Adele Zamudio, CNP, Hiro Stone, CNP, Luis Antonio Stephens, [facility]    Discharge Summary     Patient ID: Adis Salinas  :  1995   MRN: [id number]     ACCOUNT:  [de-identified]   Patient's PCP: Ana Birmingham  Admit Date: 2020   Discharge Date: 12/15/2020   Length of Stay: 1  Code Status:  Full Code  Admitting Physician: Erick Grant DO  Discharge Physician: JONATAN Lee NP     Active Discharge Diagnoses:     Hospital Problem Lists:  Principal Problem (Resolved):    Atrial fibrillation with rapid ventricular response ([facility] [address]  Active Problems:    GERD without esophagitis    IBS (irritable bowel syndrome)      Admission Condition:  fair     Discharged Condition: good    Hospital Stay:     Hospital Course:  Adis Salinas is a 26 y.o. female who was admitted for the management of  Atrial fibrillation with rapid ventricular response ([facility] [address] , presented to ER with chest pain, dizziness and palpitations.   Patient had been having intermittent episodes over the last few months however it became significantly worse prior to arrival.  She was found to be in A. fib with RVR while in ED she was given a Cardizem Spent on discharge is  31 mins in patient examination, evaluation, counseling as well as medication reconciliation, prescriptions for required medications, discharge plan and follow up. Electronically signed by   JONATAN Moctezuma NP  12/15/2020  12:51 PM      Thank you Dr. Yumiko Gunn for the opportunity to be involved in this patient's care.

## 2020-12-16 LAB
EKG ATRIAL RATE: 159 BPM
EKG Q-T INTERVAL: 284 MS
EKG QRS DURATION: 92 MS
EKG QTC CALCULATION (BAZETT): 451 MS
EKG R AXIS: 58 DEGREES
EKG T AXIS: 22 DEGREES
EKG VENTRICULAR RATE: 152 BPM

## 2021-09-13 ENCOUNTER — APPOINTMENT (OUTPATIENT)
Dept: GENERAL RADIOLOGY | Age: 26
End: 2021-09-13
Payer: COMMERCIAL

## 2021-09-13 ENCOUNTER — HOSPITAL ENCOUNTER (EMERGENCY)
Age: 26
Discharge: HOME | End: 2021-09-13
Attending: EMERGENCY MEDICINE
Payer: COMMERCIAL

## 2021-09-13 VITALS
SYSTOLIC BLOOD PRESSURE: 108 MMHG | RESPIRATION RATE: 15 BRPM | BODY MASS INDEX: 36.65 KG/M2 | DIASTOLIC BLOOD PRESSURE: 62 MMHG | HEART RATE: 79 BPM | TEMPERATURE: 98.3 F | OXYGEN SATURATION: 99 % | HEIGHT: 65 IN | WEIGHT: 220 LBS

## 2021-09-13 DIAGNOSIS — I48.0 PAROXYSMAL ATRIAL FIBRILLATION (HCC): Primary | ICD-10-CM

## 2021-09-13 LAB
ABSOLUTE EOS #: 0.5 K/UL (ref 0–0.4)
ABSOLUTE IMMATURE GRANULOCYTE: ABNORMAL K/UL (ref 0–0.3)
ABSOLUTE LYMPH #: 2 K/UL (ref 1–4.8)
ABSOLUTE MONO #: 0.8 K/UL (ref 0.1–1.2)
ANION GAP SERPL CALCULATED.3IONS-SCNC: 11 MMOL/L (ref 9–17)
BASOPHILS # BLD: 0 % (ref 0–2)
BASOPHILS ABSOLUTE: 0 K/UL (ref 0–0.2)
BUN BLDV-MCNC: 10 MG/DL (ref 6–20)
BUN/CREAT BLD: NORMAL (ref 9–20)
CALCIUM SERPL-MCNC: 9.6 MG/DL (ref 8.6–10.4)
CHLORIDE BLD-SCNC: 102 MMOL/L (ref 98–107)
CO2: 24 MMOL/L (ref 20–31)
CREAT SERPL-MCNC: 0.65 MG/DL (ref 0.5–0.9)
D-DIMER QUANTITATIVE: 0.39 MG/L FEU
DIFFERENTIAL TYPE: ABNORMAL
EOSINOPHILS RELATIVE PERCENT: 5 % (ref 1–4)
GFR AFRICAN AMERICAN: >60 ML/MIN
GFR NON-AFRICAN AMERICAN: >60 ML/MIN
GFR SERPL CREATININE-BSD FRML MDRD: NORMAL ML/MIN/{1.73_M2}
GFR SERPL CREATININE-BSD FRML MDRD: NORMAL ML/MIN/{1.73_M2}
GLUCOSE BLD-MCNC: 92 MG/DL (ref 70–99)
HCG QUALITATIVE: NEGATIVE
HCT VFR BLD CALC: 39.5 % (ref 36–46)
HEMOGLOBIN: 13.4 G/DL (ref 12–16)
IMMATURE GRANULOCYTES: ABNORMAL %
LYMPHOCYTES # BLD: 21 % (ref 24–44)
MAGNESIUM: 2.2 MG/DL (ref 1.6–2.6)
MCH RBC QN AUTO: 29.1 PG (ref 26–34)
MCHC RBC AUTO-ENTMCNC: 34 G/DL (ref 31–37)
MCV RBC AUTO: 85.6 FL (ref 80–100)
MONOCYTES # BLD: 8 % (ref 2–11)
NRBC AUTOMATED: ABNORMAL PER 100 WBC
PDW BLD-RTO: 13.5 % (ref 12.5–15.4)
PLATELET # BLD: 394 K/UL (ref 140–450)
PLATELET ESTIMATE: ABNORMAL
PMV BLD AUTO: 8.5 FL (ref 6–12)
POTASSIUM SERPL-SCNC: 4 MMOL/L (ref 3.7–5.3)
RBC # BLD: 4.62 M/UL (ref 4–5.2)
RBC # BLD: ABNORMAL 10*6/UL
SEG NEUTROPHILS: 66 % (ref 36–66)
SEGMENTED NEUTROPHILS ABSOLUTE COUNT: 6.5 K/UL (ref 1.8–7.7)
SODIUM BLD-SCNC: 137 MMOL/L (ref 135–144)
TROPONIN INTERP: NORMAL
TROPONIN INTERP: NORMAL
TROPONIN T: NORMAL NG/ML
TROPONIN T: NORMAL NG/ML
TROPONIN, HIGH SENSITIVITY: <6 NG/L (ref 0–14)
TROPONIN, HIGH SENSITIVITY: <6 NG/L (ref 0–14)
WBC # BLD: 9.8 K/UL (ref 3.5–11)
WBC # BLD: ABNORMAL 10*3/UL

## 2021-09-13 PROCEDURE — 85025 COMPLETE CBC W/AUTO DIFF WBC: CPT

## 2021-09-13 PROCEDURE — 2580000003 HC RX 258: Performed by: EMERGENCY MEDICINE

## 2021-09-13 PROCEDURE — 99284 EMERGENCY DEPT VISIT MOD MDM: CPT

## 2021-09-13 PROCEDURE — 80048 BASIC METABOLIC PNL TOTAL CA: CPT

## 2021-09-13 PROCEDURE — 36415 COLL VENOUS BLD VENIPUNCTURE: CPT

## 2021-09-13 PROCEDURE — 84703 CHORIONIC GONADOTROPIN ASSAY: CPT

## 2021-09-13 PROCEDURE — 85379 FIBRIN DEGRADATION QUANT: CPT

## 2021-09-13 PROCEDURE — 2500000003 HC RX 250 WO HCPCS: Performed by: EMERGENCY MEDICINE

## 2021-09-13 PROCEDURE — 93005 ELECTROCARDIOGRAM TRACING: CPT | Performed by: EMERGENCY MEDICINE

## 2021-09-13 PROCEDURE — 96374 THER/PROPH/DIAG INJ IV PUSH: CPT

## 2021-09-13 PROCEDURE — 96361 HYDRATE IV INFUSION ADD-ON: CPT

## 2021-09-13 PROCEDURE — 83735 ASSAY OF MAGNESIUM: CPT

## 2021-09-13 PROCEDURE — 71045 X-RAY EXAM CHEST 1 VIEW: CPT

## 2021-09-13 PROCEDURE — 84484 ASSAY OF TROPONIN QUANT: CPT

## 2021-09-13 RX ORDER — SOTALOL HYDROCHLORIDE 120 MG/1
60 TABLET ORAL 2 TIMES DAILY
Qty: 60 TABLET | Refills: 0 | Status: SHIPPED | OUTPATIENT
Start: 2021-09-13 | End: 2021-09-13 | Stop reason: SDUPTHER

## 2021-09-13 RX ORDER — SOTALOL HYDROCHLORIDE 120 MG/1
60 TABLET ORAL 2 TIMES DAILY
Qty: 60 TABLET | Refills: 0 | Status: SHIPPED | OUTPATIENT
Start: 2021-09-13

## 2021-09-13 RX ORDER — METOPROLOL TARTRATE 5 MG/5ML
5 INJECTION INTRAVENOUS ONCE
Status: COMPLETED | OUTPATIENT
Start: 2021-09-13 | End: 2021-09-13

## 2021-09-13 RX ORDER — 0.9 % SODIUM CHLORIDE 0.9 %
1000 INTRAVENOUS SOLUTION INTRAVENOUS ONCE
Status: COMPLETED | OUTPATIENT
Start: 2021-09-13 | End: 2021-09-13

## 2021-09-13 RX ADMIN — METOPROLOL TARTRATE 5 MG: 5 INJECTION INTRAVENOUS at 07:30

## 2021-09-13 RX ADMIN — SODIUM CHLORIDE 1000 ML: 9 INJECTION, SOLUTION INTRAVENOUS at 07:28

## 2021-09-13 ASSESSMENT — ENCOUNTER SYMPTOMS: SHORTNESS OF BREATH: 1

## 2021-09-13 NOTE — ED NOTES
Pt given written and verbal discharge, f/u instructions with reminder to p/u medications at preferred pharmacy, pt verbalizes understanding. Pt is ambulatory accompanied by significant other.       Marie Bradley RN  09/13/21 9960

## 2021-09-13 NOTE — ED NOTES
Patient arrived to ER with complaints of palpitations/SOB/lightheaded/dizzy since last night prior to going to sleep. Denies any loss of consciousness, states that she has a history of afib that is being controlled by medications. Patient denies any nausea or any other complaints at this time.       Jermaine Aguilera RN  09/13/21 0682

## 2021-09-14 LAB
EKG ATRIAL RATE: 187 BPM
EKG ATRIAL RATE: 64 BPM
EKG P AXIS: -2 DEGREES
EKG P-R INTERVAL: 104 MS
EKG P-R INTERVAL: 134 MS
EKG Q-T INTERVAL: 254 MS
EKG Q-T INTERVAL: 436 MS
EKG QRS DURATION: 86 MS
EKG QRS DURATION: 88 MS
EKG QTC CALCULATION (BAZETT): 434 MS
EKG QTC CALCULATION (BAZETT): 449 MS
EKG R AXIS: 53 DEGREES
EKG R AXIS: 63 DEGREES
EKG T AXIS: 15 DEGREES
EKG T AXIS: 30 DEGREES
EKG VENTRICULAR RATE: 176 BPM
EKG VENTRICULAR RATE: 64 BPM

## 2021-09-28 ENCOUNTER — OFFICE VISIT (OUTPATIENT)
Dept: ORTHOPEDIC SURGERY | Age: 26
End: 2021-09-28
Payer: COMMERCIAL

## 2021-09-28 ENCOUNTER — TELEPHONE (OUTPATIENT)
Dept: ORTHOPEDIC SURGERY | Age: 26
End: 2021-09-28

## 2021-09-28 VITALS — RESPIRATION RATE: 12 BRPM | BODY MASS INDEX: 36.65 KG/M2 | HEIGHT: 65 IN | WEIGHT: 220 LBS

## 2021-09-28 DIAGNOSIS — M25.562 ACUTE PAIN OF LEFT KNEE: Primary | ICD-10-CM

## 2021-09-28 DIAGNOSIS — M76.892 HAMSTRING TENDONITIS OF LEFT THIGH: ICD-10-CM

## 2021-09-28 PROCEDURE — 1036F TOBACCO NON-USER: CPT | Performed by: PHYSICIAN ASSISTANT

## 2021-09-28 PROCEDURE — 99204 OFFICE O/P NEW MOD 45 MIN: CPT | Performed by: PHYSICIAN ASSISTANT

## 2021-09-28 PROCEDURE — G8427 DOCREV CUR MEDS BY ELIG CLIN: HCPCS | Performed by: PHYSICIAN ASSISTANT

## 2021-09-28 PROCEDURE — G8417 CALC BMI ABV UP PARAM F/U: HCPCS | Performed by: PHYSICIAN ASSISTANT

## 2021-09-28 NOTE — TELEPHONE ENCOUNTER
Patient was seen this morning and forgot to ask for a referral to see a nutritionist.     Can you place referral and call her with details  Thank you

## 2021-09-28 NOTE — PROGRESS NOTES
[facility], [address] [place], [address] [place] [address], [address] [facility]           Dept Phone: [phone]           Dept Fax:  [address]           Clem Good          Dept Phone: [phone]           Dept Fax:  [phone]      Chief Compliant:  Chief Complaint   Patient presents with    Knee Pain     left        History of Present Illness: This is a 23 y.o. female who presents to the clinic today for evaluation of had concerns including Knee Pain (left). Ms. Christy Jay is a-year-old female presents for evaluation of 2-week history of left knee pain. Patient reports pain is most severe to the posterior lateral aspect of the knee. She denies any mechanism of injury, trauma or fall prior to onset of pain. Patient reports for the first 1 to 1-1/2 weeks she was limping secondary to the pain however this limp has improved but she continues to have pain if she stands or walks for an extended period of time. Pain does seem to be relieved with rest.    Patient has not noticed any swelling or bruising. No knee joint warmth, redness, fever or chills. No history of surgery or physical therapy in this knee.        Past History:    Current Outpatient Medications:     apixaban (ELIQUIS) 5 MG TABS tablet, Take 1 tablet by mouth 2 times daily, Disp: 60 tablet, Rfl: 0    sotalol (BETAPACE) 120 MG tablet, Take 0.5 tablets by mouth 2 times daily, Disp: 60 tablet, Rfl: 0    metoprolol succinate (TOPROL XL) 25 MG extended release tablet, Take 1 tablet by mouth daily, Disp: 30 tablet, Rfl: 3    aspirin 81 MG EC tablet, Take 1 tablet by mouth daily, Disp: 30 tablet, Rfl: 3    famotidine (PEPCID) 20 MG tablet, Take 20 mg by mouth daily, Disp: , Rfl:   Allergies   Allergen Reactions    Codeine Rash    Demerol      Social History     Socioeconomic History  Marital status: Single     Spouse name: Not on file    Number of children: Not on file    Years of education: Not on file    Highest education level: Not on file   Occupational History    Not on file   Tobacco Use    Smoking status: Never Smoker    Smokeless tobacco: Never Used   Substance and Sexual Activity    Alcohol use: Never    Drug use: Never    Sexual activity: Not on file   Other Topics Concern    Not on file   Social History Narrative    Not on file     Social Determinants of Health     Financial Resource Strain:     Difficulty of Paying Living Expenses:    Food Insecurity:     Worried About Running Out of Food in the Last Year:     [address] in the Last Year:    Transportation Needs:     Lack of Transportation (Medical):  Lack of Transportation (Non-Medical):    Physical Activity:     Days of Exercise per Week:     Minutes of Exercise per Session:    Stress:     Feeling of Stress :    Social Connections:     Frequency of Communication with Friends and Family:     Frequency of Social Gatherings with Friends and Family:     Attends Mandaeism Services:     Active Member of Clubs or Organizations:     Attends Club or Organization Meetings:     Marital Status:    Intimate Partner Violence:     Fear of Current or Ex-Partner:     Emotionally Abused:     Physically Abused:     Sexually Abused:      Past Medical History:   Diagnosis Date    Atrial fibrillation ([address]     GERD without esophagitis     IBS (irritable bowel syndrome)      Past Surgical History:   Procedure Laterality Date    BLADDER SURGERY      bladder reconstruction at age 4     Family History   Problem Relation Age of Onset    Hypertension Mother     Diabetes type 2  Mother     High Cholesterol Mother     Stroke Father     High Cholesterol Father         Review of Systems   Constitutional: Negative for fever, chills, sweats. Eyes: Negative for changes in vision, or pain.    HENT: Negative for ear ache, epistaxis, or sore throat. Respiratory/Cardio: Negative for Chest pain, palpitations, SOB, or cough. Gastrointestinal: Negative for abdominal pain, N/V/D. Genitourinary: Negative for dysuria, frequency, urgency, or hematuria. Neurological: Negative for headache, numbness, or weakness. Integumentary: Negative for rash, itching, laceration, or abrasion. Musculoskeletal: Positive for Knee Pain (left)       Physical Exam:  Constitutional: Patient is oriented to person, place, and time. Patient appears well-developed and well nourished. HENT: Negative otherwise noted  Head: Normocephalic and Atraumatic  Nose: Normal  Eyes: Conjunctivae and EOM are normal  Neck: Normal range of motion Neck supple. Respiratory/Cardio: Effort normal. No respiratory distress. Musculoskeletal:    Left Knee:     Skin: warm and dry, no rash or erythema  Vasculature: 2+ pedal pulses bilaterally  Neuro: Sensation grossly intact to light touch diffusely  Alignment: Normal  Tenderness: Minimal tenderness to posteriorlateral knee. No tenderness to either joint line no tenderness to quad/patellar tendon, pes anserine bursa or posterior knee. Effusion: None    ROM: (Degrees)       A P       Extension  0 2 deg hyperext       Flexion   130 130       Crepitation  No       Muscle strength:         Flexion   5      Extension  5      SLR   5        Extensor lag   n          Special testing:  n    Pain with deep knee flexion     n    Patellar grind       n    Patellar apprehension      n    Patellar glide         n    Lachman       n    Anterior drawer      n    Pivot shift       n    Posterior drawer      n    Dial test       n    Posterolateral drawer      n    Posterior Sag       n    MCL        n    LCL          n    Medial joint line tenderness     n    Lateral joint line tenderness     n    McMurrey's         Neurological: Patient is alert and oriented to person, place, and time. Normal strenght. No sensory deficit.   Skin: Skin is warm and dry  Psychiatric: Behavior is normal. Thought content normal.  Nursing note and vitals reviewed. Labs and Imaging:       X-rays taken in clinic today and preliminarily reviewed by me:  AP bilateral knees standing lateral view of the left knee demonstrates normal anatomic alignment. No evidence of acute fracture. Joint spaces are maintained. Small effusion is present. Orders Placed This Encounter   Procedures    XR KNEE LEFT (1-2 VIEWS)     Standing Status:   Future     Number of Occurrences:   1     Standing Expiration Date:   9/27/2022       Assessment and Plan:  1. Acute pain of left knee    2. Hamstring tendonitis of left thigh          PLAN:  Tiffany Pepe is a 23 y.o. old female who presents to the clinic today for evaluation of left knee pain concerning for hamstring tendonitis. Overall patient's examination today is unremarkable but she does have some mild pain to the lateral hamstring tendon on palpation. This is also the area of patient's pain when standing for an extended period of time. Considered in the differential are collateral/cruciate ligament sprain versus tear, meniscal injury, septic joint and fracture. There is no evidence of fracture or degenerative changes on radiographs today. No evidence of ligamentous laxity or meniscal pathology on exam.  Pain is most consistent with patellofemoral pain syndrome. 1. We discussed treatment options available to her, including nonoperative and operative intervention. 2. At this time I believe she will benefit from conservative management. 3. 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   4. Also discussed the possibility of starting patient on new anti-inflammatory medication however she is on Eliquis for a heart condition will hold off on any oral NSAIDs however she is recommended to try diclofenac gel which she has at home but has not tried at this point. 5. I'll see her back my clinic in 6 weeks for reevaluation but she was encouraged to return or call earlier with questions and/or concerns. Electronically signed by Christopher Pyle on 9/28/21 at 9:02 AM EDT        Please note that this chart was generated using voice recognition Dragon dictation software. Although every effort was made to ensure the accuracy of this automated transcription, some errors in transcription may have occurred.

## 2021-09-28 NOTE — TELEPHONE ENCOUNTER
Called pt back and she was given the number for [phone] [employer]. She was advised to call the office back if she does need a referral but that might be something that is better suited to obtain from her PCP. She did state that her PCP is out of town so she is not able to get one from them at this time. She was understanding and aggreable of this plan.

## 2021-09-28 NOTE — PATIENT INSTRUCTIONS
Patient Education        Hamstring Strain: Rehab Exercises  Introduction  Here are some examples of exercises for you to try. The exercises may be suggested for a condition or for rehabilitation. Start each exercise slowly. Ease off the exercises if you start to have pain. You will be told when to start these exercises and which ones will work best for you. How to do the exercises  Hamstring set (heel dig)    1. Sit with your affected leg bent. Your good leg should be straight and supported on the floor. 2. Tighten the muscles on the back of your bent leg (hamstring) by pressing your heel into the floor. 3. Hold for about 6 seconds, and then rest for up to 10 seconds. 4. Repeat 8 to 12 times. Hamstring curl    1. Lie on your stomach with your knees straight. Place a pillow under your stomach. If your kneecap is uncomfortable, roll up a washcloth and put it under your leg just above your kneecap. 2. Lift the foot of your affected leg by bending your knee so that you bring your foot up toward your buttock. If this motion hurts, try it without bending your knee quite as far. This may help you avoid any painful motion. 3. Slowly move your leg up and down. 4. Repeat 8 to 12 times. 5. When you can do this exercise with ease and no pain, add some resistance. To do this:  6. Tie the ends of an exercise band together to form a loop. Attach one end of the loop to a secure object or shut a door on it to hold it in place. (Or you can have someone hold one end of the loop to provide resistance.)  7. Loop the other end of the exercise band around the lower part of your affected leg. 8. Repeat steps 1 through 4, slowly pulling back on the exercise band with your leg. Hip extension    1. Stand facing a wall with your hands on the wall at about chest level. 2. Keeping the knee of your affected leg straight, kick that leg straight back behind you. 3. Relax, and lower your leg back to the starting position.   4. Repeat 8 to 12 times. 5. When you can do this exercise with ease and no pain, add some resistance. To do this:  6. Tie the ends of an exercise band together to form a loop. Attach one end of the loop to a secure object or shut a door on it to hold it in place. (Or you can have someone hold one end of the loop to provide resistance.)  7. Loop the other end of the exercise band around the lower part of your affected leg. 8. Repeat steps 1 through 4, slowly pulling back on the exercise band with your leg. Hamstring wall stretch    1. Lie on your back in a doorway, with your good leg through the open door. 2. Slide your affected leg up the wall to straighten your knee. You should feel a gentle stretch down the back of your leg. 3. Hold the stretch for at least 1 minute to begin. Then try to lengthen the time you hold the stretch to as long as 6 minutes. 4. Repeat 2 to 4 times. 5. If you do not have a place to do this exercise in a doorway, there is another way to do it:  6. Lie on your back, and bend the knee of your affected leg. 7. Loop a towel under the ball and toes of that foot, and hold the ends of the towel in your hands. 8. Straighten your knee, and slowly pull back on the towel. You should feel a gentle stretch down the back of your leg. 9. Hold the stretch for 15 to 30 seconds. Or even better, hold the stretch for 1 minute if you can. 10. Repeat 2 to 4 times. 1. Do not arch your back. 2. Do not bend either knee. 3. Keep one heel touching the floor and the other heel touching the wall. Do not point your toes. Calf stretch    1. Stand facing a wall with your hands on the wall at about eye level. Put your affected leg about a step behind your other leg. 2. Keeping your back leg straight and your back heel on the floor, bend your front knee and gently bring your hip and chest toward the wall until you feel a stretch in the calf of your back leg. 3. Hold the stretch for 15 to 30 seconds.   4. Repeat 2 to 4 times.  5. Repeat steps 1 through 4, but this time keep your back knee bent. Single-leg balance    1. Stand on a flat surface with your arms stretched out to your sides like you are making the letter \"T. \" Then lift your good leg off the floor, bending it at the knee. If you are not steady on your feet, use one hand to hold on to a chair, counter, or wall. 2. Standing on your affected leg, keep that knee straight. Try to balance on that leg for up to 30 seconds. Then rest for up to 10 seconds. 3. Repeat 6 to 8 times. 4. When you can balance on your affected leg for 30 seconds with your eyes open, try to balance on it with your eyes closed. 5. When you can do this exercise with your eyes closed for 30 seconds and with ease and no pain, try standing on a pillow or piece of foam, and repeat steps 1 through 4. Follow-up care is a key part of your treatment and safety. Be sure to make and go to all appointments, and call your doctor if you are having problems. It's also a good idea to know your test results and keep a list of the medicines you take. Where can you learn more? Go to [email or web address] and sign in to your [employer] account. Enter [phone] in the [facility] box to learn more about \"Hamstring Strain: Rehab Exercises. \"     If you do not have an account, please click on the \"Sign Up Now\" link. Current as of: July 1, 2021               Content Version: 13.0  © 2006-2021 [employer]. Care instructions adapted under license by [facility] ([facility]). If you have questions about a medical condition or this instruction, always ask your healthcare professional. [place] [address] any warranty or liability for your use of this information.

## 2022-01-01 NOTE — ED PROVIDER NOTES
[id number] [facility] ED  [address] [facility] [address]  Phone: [phone]  Fax: [phone]        Pt Name: Karlos Simpson  MRN: [id number]  [place] 1995  Date of evaluation: 9/13/21      CHIEF COMPLAINT     Chief Complaint   Patient presents with    Palpitations     SOB / lightheaded / dizzy          HISTORY OF PRESENT ILLNESS  (Location/Symptom, Timing/Onset, Context/Setting, Quality, Duration, Modifying Factors, Severity.)    Karlos Simpson is a 23 y.o. female who presents with palpitations lightheaded dizzy. The patient states she has a history of paroxysmal atrial fibrillation of which she was on metoprolol but switched over to sotalol this morning she has been feeling palpitations associate with some shortness of breath lightheaded and dizziness of which nothing she does makes her symptoms better or worse no fever no chills no cough no abdominal pain no vomiting no diarrhea no dysuria no unusual swelling of the arms or legs      REVIEW OF SYSTEMS    (2-9 systems for level 4, 10 or more for level 5)     Review of Systems   Respiratory: Positive for shortness of breath. Cardiovascular: Positive for palpitations. Neurological: Positive for dizziness and light-headedness. All other systems reviewed and are negative. PAST MEDICAL HISTORY    has a past medical history of Atrial fibrillation ([address] GERD without esophagitis, and IBS (irritable bowel syndrome). SURGICAL HISTORY      has a past surgical history that includes Bladder surgery. CURRENTMEDICATIONS       Previous Medications    ASPIRIN 81 MG EC TABLET    Take 1 tablet by mouth daily    FAMOTIDINE (PEPCID) 20 MG TABLET    Take 20 mg by mouth daily    METOPROLOL SUCCINATE (TOPROL XL) 25 MG EXTENDED RELEASE TABLET    Take 1 tablet by mouth daily       ALLERGIES     is allergic to codeine and demerol. FAMILY HISTORY     She indicated that her mother is alive.  She indicated that her father is alive. family history includes Diabetes type 2  in her mother; High Cholesterol in her father and mother; Hypertension in her mother; Stroke in her father. SOCIAL HISTORY      reports that she has never smoked. She has never used smokeless tobacco. She reports that she does not drink alcohol and does not use drugs. PHYSICAL EXAM    (up to 7 for level 4, 8 or more for level 5)   INITIAL VITALS:  height is 5' 5\" (1.651 m) and weight is 99.8 kg (220 lb). Her oral temperature is 98.3 °F (36.8 °C). Her blood pressure is 108/62 and her pulse is 76. Her respiration is 15 and oxygen saturation is 99%. Physical Exam  Vitals and nursing note reviewed. Constitutional:       Comments: Mild distress secondary to HPI   HENT:      Head: Normocephalic and atraumatic. Mouth/Throat:      Mouth: Mucous membranes are moist.      Pharynx: Oropharynx is clear. Eyes:      Conjunctiva/sclera: Conjunctivae normal.   Cardiovascular:      Rate and Rhythm: Tachycardia present. Rhythm irregular. Pulses: Normal pulses. Heart sounds: Normal heart sounds. Pulmonary:      Effort: Pulmonary effort is normal. No respiratory distress. Breath sounds: Normal breath sounds. No stridor. No wheezing, rhonchi or rales. Abdominal:      General: Bowel sounds are normal. There is no distension. Palpations: Abdomen is soft. There is no mass. Tenderness: There is no abdominal tenderness. There is no right CVA tenderness, left CVA tenderness, guarding or rebound. Musculoskeletal:         General: No swelling or tenderness. Normal range of motion. Cervical back: Normal range of motion and neck supple. No rigidity or tenderness. Comments: No calf swelling or tenderness appreciated   Lymphadenopathy:      Cervical: No cervical adenopathy. Skin:     General: Skin is warm and dry. Findings: No rash. Neurological:      General: No focal deficit present. Mental Status: She is alert. DIFFERENTIAL DIAGNOSIS/ MDM:     Patient is noted on the monitor to be what appears to be in paroxysmal atrial fibrillation IV will be established I will give the patient IV fluid bolus the patient is on a beta-blocker did not take her medications this morning so I will give her 5 mg of Lopressor we will get an EKG labs chest x-ray    DIAGNOSTIC RESULTS     EKG: All EKG's are interpreted by the Emergency Department Physician who either signs or Co-signs this chart in the absence of a cardiologist.      Interpreted by Xavier Marroquin MD     Rhythm: Atrial fibrillation versus SVT  Rate:176  Axis: 61  Ectopy: none  Conduction: normal  ST Segments: no acute change  T Waves: Nonspecific  Q Waves: none    Clinical Impression: Atrial fibrillation with rapid ventricular response versus SVT and nonspecific T waves      Interpreted by Xavier Marroquin MD     Rhythm: normal sinus   Rate: 64  Axis: 53  Ectopy: none  Conduction: normal  ST Segments: no acute change  T Waves: no acute change  Q Waves: none    Clinical Impression: normal sinus rhythm with no acute changes/normal EKG. No acute infarction/ischemia noted. RADIOLOGY:        Interpretation per the Radiologist below, if available at the time of this note:    XR CHEST PORTABLE (Final result)  Result time 09/13/21 07:59:07  Final result by Bee Park MD (09/13/21 07:59:07)                Impression:    No acute cardiopulmonary process             Narrative:    EXAMINATION:   ONE XRAY VIEW OF THE CHEST     9/13/2021 7:52 am     COMPARISON:   December 14, 2020     HISTORY:   ORDERING SYSTEM PROVIDED HISTORY: palpitations   TECHNOLOGIST PROVIDED HISTORY:   palpitations   Reason for Exam: Pt states has Palpitations, SOB and is lightheaded   Acuity: Unknown   Type of Exam: Unknown     FINDINGS:   Cardiomediastinal silhouette is normal in size. The lungs are clear.  No pleural effusion or pneumothorax is present. LABS:  Results for orders placed or performed during the hospital encounter of 09/13/21   CBC Auto Differential   Result Value Ref Range    WBC 9.8 3.5 - 11.0 k/uL    RBC 4.62 4.0 - 5.2 m/uL    Hemoglobin 13.4 12.0 - 16.0 g/dL    Hematocrit 39.5 36 - 46 %    MCV 85.6 80 - 100 fL    MCH 29.1 26 - 34 pg    MCHC 34.0 31 - 37 g/dL    RDW 13.5 12.5 - 15.4 %    Platelets 234 689 - 772 k/uL    MPV 8.5 6.0 - 12.0 fL    NRBC Automated NOT REPORTED per 100 WBC    Differential Type NOT REPORTED     Seg Neutrophils 66 36 - 66 %    Lymphocytes 21 (L) 24 - 44 %    Monocytes 8 2 - 11 %    Eosinophils % 5 (H) 1 - 4 %    Basophils 0 0 - 2 %    Immature Granulocytes NOT REPORTED 0 %    Segs Absolute 6.50 1.8 - 7.7 k/uL    Absolute Lymph # 2.00 1.0 - 4.8 k/uL    Absolute Mono # 0.80 0.1 - 1.2 k/uL    Absolute Eos # 0.50 (H) 0.0 - 0.4 k/uL    Basophils Absolute 0.00 0.0 - 0.2 k/uL    Absolute Immature Granulocyte NOT REPORTED 0.00 - 0.30 k/uL    WBC Morphology NOT REPORTED     RBC Morphology NOT REPORTED     Platelet Estimate NOT REPORTED    Basic Metabolic Panel   Result Value Ref Range    Glucose 92 70 - 99 mg/dL    BUN 10 6 - 20 mg/dL    CREATININE 0.65 0.50 - 0.90 mg/dL    Bun/Cre Ratio NOT REPORTED 9 - 20    Calcium 9.6 8.6 - 10.4 mg/dL    Sodium 137 135 - 144 mmol/L    Potassium 4.0 3.7 - 5.3 mmol/L    Chloride 102 98 - 107 mmol/L    CO2 24 20 - 31 mmol/L    Anion Gap 11 9 - 17 mmol/L    GFR Non-African American >60 >60 mL/min    GFR African American >60 >60 mL/min    GFR Comment          GFR Staging NOT REPORTED    Troponin   Result Value Ref Range    Troponin, High Sensitivity <6 0 - 14 ng/L    Troponin T NOT REPORTED <0.03 ng/mL    Troponin Interp NOT REPORTED    D-Dimer, Quantitative   Result Value Ref Range    D-Dimer, Quant 0.39 mg/L FEU   HCG Qualitative, Serum   Result Value Ref Range    hCG Qual NEGATIVE NEGATIVE   Magnesium   Result Value Ref Range    Magnesium 2.2 1.6 - 2.6 mg/dL   Troponin   Result Value Ref Range    Troponin, High Sensitivity <6 0 - 14 ng/L    Troponin T NOT REPORTED <0.03 ng/mL    Troponin Interp NOT REPORTED            EMERGENCY DEPARTMENT COURSE:   Vitals:    Vitals:    09/13/21 0714 09/13/21 0735 09/13/21 0836   BP: 121/75 113/65 108/62   Pulse: 170 79 76   Resp: 18 14 15   Temp: 98.3 °F (36.8 °C)     TempSrc: Oral     SpO2: 97% 99% 99%   Weight: 99.8 kg (220 lb)     Height: 5' 5\" (1.651 m)       -------------------------  BP: 108/62, Temp: 98.3 °F (36.8 °C), Pulse: 76, Resp: 15      RE-EVALUATION:  The patient presented with SVT versus atrial fibrillation with rapid ventricular response this was converted after being given 5 mg of Lopressor the patient was discussed with a cardiologist who is requesting we increase her sotalol as well as start her on Eliquis the patient is requesting to go home she feels at her baseline status her cardiologist is agreeable to this plan so I will go ahead and write that prescription for the sotalol and the Eliquis I am recommending that she avoid any stimulants such as caffeine she is to return to the ER for any further symptoms any chest pain shortness of breath lightheaded dizziness or other concerns otherwise to call her cardiologist today to arrange a follow-up appointment  CRITICAL CARE: There was a high probability of clinically significant/life threatening deterioration in this patient's condition which required my urgent intervention. Total critical care time was 30 minutes. This excludes any time for separately reportable procedures. At this time the patient is without objective evidence of an acute process requiring hospitalization or inpatient management. They have remained hemodynamically stable throughout their entire ED visit and are stable for discharge with outpatient follow-up.      The patient understands that at this time there is no evidence for a more malignant underlying process, but the patient also understands that early in the process of an illness or injury, an emergency department workup can be falsely reassuring. Routine discharge counseling was given, and the patient understands that worsening, changing or persistent symptoms should prompt an immediate call or follow up with their primary physician or return to the emergency department. The importance of appropriate follow up was also discussed. I have reviewed the disposition diagnosis with the patient and or their family/guardian. I have answered their questions and given discharge instructions. They voiced understanding of these instructions and did not have any further questions or complaints. CONSULTS:  Dr. Hilda Collins is requesting that the patient be given 60 mg of sotalol twice a day and be started on Eliquis 5 mg twice a day he will see the patient in his office for follow-up    PROCEDURES:  None    FINAL IMPRESSION      1. Paroxysmal atrial fibrillation (HCC)          DISPOSITION/PLAN   DISPOSITION        CONDITION ON DISPOSITION:   Improved - Stable    PATIENT REFERRED TO:  No follow-up provider specified. DISCHARGE MEDICATIONS:  New Prescriptions    APIXABAN (ELIQUIS) 5 MG TABS TABLET    Take 1 tablet by mouth 2 times daily    SOTALOL (BETAPACE) 120 MG TABLET    Take 0.5 tablets by mouth 2 times daily       (Please note that portions of this note were completed with a voicerecognition program.  Efforts were made to edit the dictations but occasionally words are mis-transcribed.)    Ginger Mora MD,, MD, F.A.C.E.P.   Attending Emergency Medicine Physician       Ginger Mora MD  09/13/21 [address] Timmy Jerez MD  09/13/21 810 [facility] MD Meri  09/13/21 100 no